# Patient Record
Sex: MALE | Race: WHITE
[De-identification: names, ages, dates, MRNs, and addresses within clinical notes are randomized per-mention and may not be internally consistent; named-entity substitution may affect disease eponyms.]

---

## 2018-04-23 LAB
BUN BLD-MCNC: 10 MG/DL (ref 6–20)
GLUCOSE SERPLBLD-MCNC: 84 MG/DL (ref 65–120)
HCT VFR BLD CALC: 48.2 % (ref 39.6–49)
LYMPHOCYTES # SPEC AUTO: 1.4 K/UL (ref 0.7–4.9)
MCH RBC QN AUTO: 31.5 PG (ref 27–35)
MCV RBC: 93 FL (ref 80–100)
PMV BLD: 8.3 FL (ref 7.6–11.3)
POTASSIUM SERPL-SCNC: 4.5 MEQ/L (ref 3.6–5)
RBC # BLD: 5.18 M/UL (ref 4.33–5.43)

## 2018-04-23 NOTE — EKG
Test Date:    2018-04-23               Test Time:    10:50:00

Technician:   MARKELL                                    

                                                     

MEASUREMENT RESULTS:                                       

Intervals:                                           

Rate:         72                                     

RI:           154                                    

QRSD:         98                                     

QT:           376                                    

QTc:          411                                    

Axis:                                                

P:            48                                     

RI:           154                                    

QRS:          -7                                     

T:            54                                     

                                                     

INTERPRETIVE STATEMENTS:                                       

                                                     

Normal sinus rhythm with sinus arrhythmia

Normal ECG

Compared to ECG 04/09/1995 13:54:00

Sinus bradycardia no longer present



Electronically Signed On 04-23-18 13:52:23 CDT by Kai Ruffin

## 2018-04-23 NOTE — RAD REPORT
EXAM DESCRIPTION:  Nyla Silveira (2 Views)4/23/2018 11:11 am

 

CLINICAL HISTORY:  Preop/hernia repair

 

COMPARISON:  None

 

FINDINGS:   The lungs appear clear of acute infiltrate. The heart is normal size

 

IMPRESSION:   No acute abnormalities displayed

## 2018-04-25 ENCOUNTER — HOSPITAL ENCOUNTER (OUTPATIENT)
Dept: HOSPITAL 97 - OR | Age: 38
Discharge: HOME | End: 2018-04-25
Attending: SURGERY
Payer: COMMERCIAL

## 2018-04-25 DIAGNOSIS — K40.30: Primary | ICD-10-CM

## 2018-04-25 PROCEDURE — 80048 BASIC METABOLIC PNL TOTAL CA: CPT

## 2018-04-25 PROCEDURE — 0YQ50ZZ REPAIR RIGHT INGUINAL REGION, OPEN APPROACH: ICD-10-PCS

## 2018-04-25 PROCEDURE — 71046 X-RAY EXAM CHEST 2 VIEWS: CPT

## 2018-04-25 PROCEDURE — 88302 TISSUE EXAM BY PATHOLOGIST: CPT

## 2018-04-25 PROCEDURE — 36415 COLL VENOUS BLD VENIPUNCTURE: CPT

## 2018-04-25 PROCEDURE — 85025 COMPLETE CBC W/AUTO DIFF WBC: CPT

## 2018-04-25 PROCEDURE — 93005 ELECTROCARDIOGRAM TRACING: CPT

## 2018-04-25 RX ADMIN — MEPERIDINE HYDROCHLORIDE ONE MG: 50 INJECTION, SOLUTION INTRAMUSCULAR; INTRAVENOUS; SUBCUTANEOUS at 11:47

## 2018-04-25 RX ADMIN — MEPERIDINE HYDROCHLORIDE ONE MG: 50 INJECTION, SOLUTION INTRAMUSCULAR; INTRAVENOUS; SUBCUTANEOUS at 11:40

## 2018-04-25 NOTE — P.BOP
Preoperative diagnosis: incarcerated right inguinal hernia


Postoperative diagnosis: same


Primary procedure: Open repair of incarcerated right inguinal hernia with 

hydrocelecomy


Assistant: KATYA GASPAR


Estimated blood loss: <10cc


Specimen: hernia sac/hydrocele


Findings: hernia plus large hydrocele


Anesthesia: General


Complications: None


Transferred to: Recovery Room


Condition: Fair

## 2018-06-20 NOTE — OP
Date of Procedure:  04/25/2018



Surgeon:  Jet Pringle MD



Assistant:  Hui Gandhi.



Preoperative Diagnosis:  Incarcerated right inguinal hernia.



Postoperative Diagnosis:  Incarcerated right inguinal hernia plus large hydrocele.



Procedures:  Open repair of incarcerated right inguinal hernia with right hydrocelectomy.



Findings:  A large hydrocele on the right side and also incarcerated right inguinal hernia.



Anesthesia:  General plus local.



Indications:  This is the case of a 38-year-old patient, comes with a very large right inguinal herni
a __________.  He understands benefits, alternatives, and risks of repair of right inguinal hernia wi
th mesh which include, but are not limited to infection, bleeding, damage to adjacent structures anes
thesia complication, recurrence, MI, and even death.  He also understands this may not relieve any sy
mptoms.  He might need more than one surgical intervention.  He understood also the chance of chronic
 numbness, chronic pain, and also testicular damage.  He was explained pros and cons of mesh placemen
t.  He understands and he was allowed to ask questions and they were answered to his satisfaction.



Description Of Procedure:  The patient was brought to the operating room, placed in supine position. 
 Anesthesia was done without complication.  A time-out was called.  Abdomen, genitalia, and inguinal 
area were prepped and draped in usual sterile fashion.  Incision was made in the right inguinal regio
n.  Incision was carried down to Kris's fascia, which was opened under direct vision.  External obl
ique aponeurosis was identified and opened in the direction of the fibers to meet the superficial ing
uinal ring.  Ilioinguinal nerve and iliohypogastric nerve were protected behind external oblique apon
eurosis.  Penrose placed around the spermatic cord.  We noticed the patient has a large hernia sac wi
th the something.  __________ gigantic hydrocele.  We identified the hernia sac, dissected, and freed
.  This hernia sac goes all the way down and get stuck to the hydrocele.  I could not remove the hayden
ia sac without getting some of the hydrocele addressed.  This patient will need a urologist in town a
nd he is not available at this moment to be in the case, so what I dissected the hernia sac from the 
spermatic cord.  We protected the spermatic cord structures all the time.  Then after that, we procee
ded to carefully, identified the hydrocele is large, will need to have a formal excision probably fro
m the scrotum to get the entire area out.  This part of the hydrocele was removed with the hernia sac
.  Several hundreds of fluids were removed from the hydrocele area.  Hernia sac was then removed and 
opened completely.  We noticed some incarcerated omentum that was retracted into the abdominal cavity
.  The hernia sac was then twisted and suture ligated making sure the intestines are not involved.  T
his was done with Prolene.  Mesh plug was placed in the deep inguinal ring and secured in place with 
VersaTack staples.  The testicles placed back where they belongs, made sure they are not twisted in t
he scrotum.  The mesh sheath was placed in the inguinal canal securing that to the pubic tubercle, sh
elving edge of inguinal ligament, transversalis fascia, and the looped around the spermatic cord with
out strangulation.  The area was irrigated.  The area of the testicle and hydrocele does not show any
 bleeding.  The area was irrigated and after that, I proceeded to carefully __________ ilioinguinal n
erve and iliohypogastric nerve back into the inguinal canal, reconstructed superficial inguinal ring 
include external oblique aponeurosis without including the nerve.  Kris's fascia was approximated a
nd then the skin with staples.  Sponge count and instrument counts were correct.  Scrotal support was
 placed there.  The patient tolerated the procedure well.  The patient was sent to recovery in stable
 condition.





LUIS DANIEL/MED

DD:  06/20/2018 07:49:43Voice ID:  [id number]

DT:  06/20/2018 14:25:00Report ID:  [id number]

## 2018-06-20 NOTE — DS
Date of Discharge:  04/25/2018



Diagnosis:  Incarcerated right inguinal hernia plus a large hydrocele on the right side.



Procedures:  Open repair of incarcerated right inguinal hernia with hydrocelectomy.



Disposition:  Home.



Activity:  As tolerated.  No heavy lifting.



Followup:  Follow in my office in 1 week.  Call for appointment [phone].  Keep area dry for 48 hours
, then may shower.  The patient advised about the importance of consulting urologist as soon as possi
ble to address the issue of the hydrocele since there is a high chance of recurrence.



Medications:  See orders.





LUIS DANIEL/MED

DD:  06/20/2018 07:49:43Voice ID:  [id number]

DT:  06/20/2018 14:27:03Report ID:  [id number]